# Patient Record
Sex: FEMALE
[De-identification: names, ages, dates, MRNs, and addresses within clinical notes are randomized per-mention and may not be internally consistent; named-entity substitution may affect disease eponyms.]

---

## 2021-02-12 PROBLEM — Z00.00 ENCOUNTER FOR PREVENTIVE HEALTH EXAMINATION: Status: ACTIVE | Noted: 2021-02-12

## 2021-03-12 ENCOUNTER — APPOINTMENT (OUTPATIENT)
Dept: GASTROENTEROLOGY | Facility: CLINIC | Age: 26
End: 2021-03-12

## 2021-03-17 ENCOUNTER — APPOINTMENT (OUTPATIENT)
Dept: GASTROENTEROLOGY | Facility: CLINIC | Age: 26
End: 2021-03-17
Payer: COMMERCIAL

## 2021-03-17 VITALS
SYSTOLIC BLOOD PRESSURE: 110 MMHG | HEIGHT: 62 IN | BODY MASS INDEX: 20.98 KG/M2 | RESPIRATION RATE: 18 BRPM | HEART RATE: 102 BPM | TEMPERATURE: 98.2 F | WEIGHT: 114 LBS | DIASTOLIC BLOOD PRESSURE: 70 MMHG | OXYGEN SATURATION: 98 %

## 2021-03-17 DIAGNOSIS — K58.2 MIXED IRRITABLE BOWEL SYNDROME: ICD-10-CM

## 2021-03-17 PROCEDURE — 99244 OFF/OP CNSLTJ NEW/EST MOD 40: CPT

## 2021-03-17 PROCEDURE — 99072 ADDL SUPL MATRL&STAF TM PHE: CPT

## 2021-03-17 RX ORDER — NORETHINDRONE ACETATE AND ETHINYL ESTRADIOL 1; 20 MG/1; UG/1
TABLET ORAL
Refills: 0 | Status: ACTIVE | COMMUNITY

## 2021-03-17 RX ORDER — HYOSCYAMINE SULFATE 0.12 MG/1
0.12 TABLET SUBLINGUAL 4 TIMES DAILY
Qty: 30 | Refills: 1 | Status: ACTIVE | COMMUNITY
Start: 2021-03-17 | End: 1900-01-01

## 2021-03-17 NOTE — HISTORY OF PRESENT ILLNESS
[FreeTextEntry1] : Office consultation on 3/17/2021.\par \par The patient is a 25-year-old woman evaluated for consultation regarding complaint of chronic abdominal pain and gaseous discomfort.  PMD: Dr. Oli Martin.\par \par Current symptoms of approximately 1 year duration.  Intermittent left lower quadrant abdominal pain and gaseous discomfort described as bloating and excessive flatus.  Precipitating factors at symptom onset such as diet change, travel, new medications etc. not reported\par \par Left lower quadrant pain is described as a dull intermittent nonradiating pain of moderate severity rated at 4–5/10.  Fluctuates in severity.  Can persist for hours throughout the day.  Mostly noted upon awakening and at night when going to bed.  Less of an issue during the day when she is distracted and working.  Precipitating factors not reported.  No relation to eating or particular food.  Gets some relief after moving her bowels.\par \par Experiencing gaseous symptoms characterized as excessive flatus and bloating.  No significant complaints of distention.\par \par Bowel movements are irregular.  Although she typically has 1-2 bowel movements daily approximately once per week she can experience diarrhea with evacuation of approximately 4 mushy unformed stools.  Never observes blood.  Occasionally sees mucus.  Triggers for diarrhea such as particular food or anything else not reported.  In addition, on occasion she also experiences constipation described as urge to go but difficulty evacuating or passage of [place] 1 scybalous stools.\par \par Denies fever.  Appetite stable reports 3 pound weight increase over the past year.  Denies any complaints of oral ulcers, dysphagia, heartburn, nausea or vomiting.\par \par Abdominal ultrasound on 3/5/2021 was normal.  Liver, gallbladder, bile duct and visualized pancreas was normal.\par \par Patient had been evaluated by gastroenterologist.  Laboratory assessment of 1/18/2021:\par -CBC: WBC 7010, hemoglobin 13.2, hematocrit 41.\par -CMP: Creatinine 0.8.  Normal liver enzymes.  ALT 11.\par -CRP 0.22.\par -TTG IgA <1.2.  Additional serology for celiac disease also negative including EMA and DGA.\par -T3 normal at 105.\par -T4 normal at 7.2.\par -TSH normal at 0.75.\par -Free T4 normal at 1.3.\par -Free T3 2.85.\par -ANCA negative.\par -Serum iron 119 with saturation 32%.\par -Ferritin 96.\par \par Recently was evaluated by gynecologist and patient reports normal pelvic examination.  Has upcoming pelvic ultrasound scheduled.\par \par Patient is on Junel for the past 6 years.  Has regular menstrual cycles every 28 days with menstruation lasting 5 days.\par \par Patient reports no other past history of digestive illness except as noted.  Family history of colon cancer not reported.  Mother treated for severe diverticulitis requiring segmental colon resection.

## 2021-03-17 NOTE — PHYSICAL EXAM
[General Appearance - Alert] : alert [General Appearance - In No Acute Distress] : in no acute distress [General Appearance - Well Nourished] : well nourished [General Appearance - Well Developed] : well developed [General Appearance - Well-Appearing] : healthy appearing [Sclera] : the sclera and conjunctiva were normal [Neck Appearance] : the appearance of the neck was normal [Neck Cervical Mass (___cm)] : no neck mass was observed [Respiration, Rhythm And Depth] : normal respiratory rhythm and effort [Exaggerated Use Of Accessory Muscles For Inspiration] : no accessory muscle use [Auscultation Breath Sounds / Voice Sounds] : lungs were clear to auscultation bilaterally [Heart Rate And Rhythm] : heart rate was normal and rhythm regular [Heart Sounds] : normal S1 and S2 [Heart Sounds Gallop] : no gallops [Murmurs] : no murmurs [Heart Sounds Pericardial Friction Rub] : no pericardial rub [Edema] : there was no peripheral edema [Bowel Sounds] : normal bowel sounds [Abdomen Soft] : soft [] : no hepato-splenomegaly [Abdomen Mass (___ Cm)] : no abdominal mass palpated [Abdomen Hernia] : no hernia was discovered [Cervical Lymph Nodes Enlarged Posterior Bilaterally] : posterior cervical [Cervical Lymph Nodes Enlarged Anterior Bilaterally] : anterior cervical [Supraclavicular Lymph Nodes Enlarged Bilaterally] : supraclavicular [No CVA Tenderness] : no ~M costovertebral angle tenderness [Abnormal Walk] : normal gait [Nail Clubbing] : no clubbing  or cyanosis of the fingernails [Skin Color & Pigmentation] : normal skin color and pigmentation [Oriented To Time, Place, And Person] : oriented to person, place, and time [Impaired Insight] : insight and judgment were intact [Affect] : the affect was normal [Mood] : the mood was normal [FreeTextEntry1] : Mild left lower quadrant tenderness elicited.  Rebound or guarding not detected.

## 2021-03-17 NOTE — CONSULT LETTER
[Dear  ___] : Dear  [unfilled], [Consult Letter:] : I had the pleasure of evaluating your patient, [unfilled]. [( Thank you for referring [unfilled] for consultation for _____ )] : Thank you for referring [unfilled] for consultation for [unfilled] [Please see my note below.] : Please see my note below. [Consult Closing:] : Thank you very much for allowing me to participate in the care of this patient.  If you have any questions, please do not hesitate to contact me. [Sincerely,] : Sincerely, [FreeTextEntry2] : Dr. Delta Martin [FreeTextEntry3] : Chuy Freitas M.D.

## 2021-03-17 NOTE — ASSESSMENT
[FreeTextEntry1] : Impression:\par \par IBS–chronic symptoms (1 year) of left lower quadrant abdominal pain in association with irregular bowel movements (alternating diarrhea and constipation) in conjunction with gaseous symptoms of bloating and excessive flatus.  Functional etiology of symptoms attributed to IBS suspected.  Dietary component of symptoms from lactose intolerance or other dietary sensitivity may be contributory.  Advised completing GYN work-up with pelvic ultrasound to assess for any possible gynecologic etiology of left lower quadrant abdominal pain.  Doubt IBD.  Alarm symptoms not reported.  Celiac serology negative.

## 2021-03-17 NOTE — REASON FOR VISIT
[Consultation] : a consultation visit [Parent] : parent [FreeTextEntry1] : for evaluation of abdominal pain and gaseous discomfort.